# Patient Record
Sex: FEMALE | Race: WHITE
[De-identification: names, ages, dates, MRNs, and addresses within clinical notes are randomized per-mention and may not be internally consistent; named-entity substitution may affect disease eponyms.]

---
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
## 2021-05-10 NOTE — PROCEDURE: URINE PREGNANCY TEST
Xolair Pregnancy And Lactation Text: This medication is Pregnancy Category B and is considered safe during pregnancy. This medication is excreted in breast milk.
Lot # (Optional): [id number]
Expiration Date (Optional): 2019-12-31
Detail Level: None
Urine Pregnancy Test Result: negative
Performed By: [email or web address]